# Patient Record
Sex: MALE | Race: WHITE | Employment: OTHER | RURAL
[De-identification: names, ages, dates, MRNs, and addresses within clinical notes are randomized per-mention and may not be internally consistent; named-entity substitution may affect disease eponyms.]

---

## 2021-12-21 ENCOUNTER — HOSPITAL ENCOUNTER (OUTPATIENT)
Dept: PHYSICAL THERAPY | Age: 66
Discharge: HOME | End: 2021-12-21
Payer: COMMERCIAL

## 2021-12-21 PROCEDURE — 97110 THERAPEUTIC EXERCISES: CPT

## 2021-12-21 PROCEDURE — 97161 PT EVAL LOW COMPLEX 20 MIN: CPT

## 2021-12-21 PROCEDURE — 97014 ELECTRIC STIMULATION THERAPY: CPT

## 2021-12-21 NOTE — PROGRESS NOTES
[facility]  [address] [facility] [place] [facility]:  [phone]  FAX: [phone]      Plan of Care/ Statement of Necessity for Physical Therapy Services    Patient name: Christel Ventura Start of Care: 2021   Referral source: Andre Jasso,* : 1955    Medical Diagnosis: Other low back pain [M54.59]   Onset Date: 2021    Treatment Diagnosis: low back pain   Prior Hospitalization: see medical history Provider#: [id number]   Medications: Verified on Patient summary List    Comorbidities: see medically   Prior Level of Function: independent     The Plan of Care and following information is based on the information from the initial evaluation. Assessment/ key information: Pt. Arriving to therapy reporting lower lumbar back pain/stiffness, specifically the R side> L that does not radiate to his lower extremities. Pt. Has a negative SLR as well bilaterally. Pt. Demonstrated decreased functional flexibility in hamstrings, piriformis, and various other LE R>L upon assessment. Pt. Also has decreased functional lumbar/T-spine ROM deficits that physical therapy could address.     Evaluation Complexity History LOW Complexity : Zero comorbidities / personal factors that will impact the outcome / POC; Examination LOW Complexity : 1-2 Standardized tests and measures addressing body structure, function, activity limitation and / or participation in recreation  ;Presentation LOW Complexity : Stable, uncomplicated  ;Clinical Decision Making LOW Complexity : FOTO score of   Overall Complexity Rating: LOW   Problem List: pain affecting function, decrease ROM and decrease strength   Treatment Plan may include any combination of the following: Therapeutic exercise, Therapeutic activities, Neuromuscular re-education and Physical agent/modality  Patient / Family readiness to learn indicated by: asking questions, trying to perform skills and interest  Persons(s) to be included in education: patient (P)  Barriers to Learning/Limitations: None  Patient Goal (s): I want to be able to do all of my normal routine without this pain in my back.   Patient Self Reported Health Status: good  Rehabilitation Potential: good    Short Term Goals: To be accomplished in 8 treatments:   Pt. Will be independent and adherent to HEP   Pt. Will be able to bend down and  a 5-10lb object without lower lumbar pain/stiffiness/discomfort  Long Term Goals: To be accomplished in 16 treatments:   Pt. Will have WFL Lumbar spine ROM   Pt. Will score >72 on FOTO   Pt. Will demonstrate proper ergonomics and form with all therapeutic exercises for prevention of future strain on the lumbar spine  Frequency / Duration: Patient to be seen 1 time per week for 16 treatments. Patient/ Caregiver education and instruction: Diagnosis, prognosis, self care and exercises   []  Plan of care has been reviewed with PTA    Emil Zhou, PT, DPT, EP-C 12/21/2021     ________________________________________________________________________    I certify that the above Therapy Services are being furnished while the patient is under my care. I agree with the treatment plan and certify that this therapy is necessary. [de-identified] Signature:____________________  Date:____________Time: _________           Shannan Colon,*    Please sign and return to:  [facility]  [address] [place], [place], [address] [facility]:  [address] [place] [address] Street: [phone]

## 2021-12-21 NOTE — THERAPY EVALUATION
PT INITIAL EVALUATION NOTE - [facility] 2-15    Patient Name: Rebekah Fabry  Date:2021  : 1955  [x]  Patient  Verified  Payor: Shelby Baltazar / Plan: Hannah Rios / Product Type: Commerical /    In time: 647 Out time:  Total Treatment Time (min): 40  Total Timed Codes (min): 40  1:1 Treatment Time ( [address] only): 40   Visit #: 1     Treatment Area: Other low back pain [M54.59]    SUBJECTIVE  Pain Level (0-10 scale): 0/10  Any medication changes, allergies to medications, adverse drug reactions, diagnosis change, or new procedure performed?: [] No    [x] Yes (see summary sheet for update)  Subjective:    \"I tore my muscle in my back bending down doing work outside. \"  PMHx/Surgical Hx: see medical history  Living Situation: at home with wife  Pt Goals: \"Get back to not having       OBJECTIVE/EXAMINATION  Posture: Forward head, rounded shoulders  Gait and Functional Mobility: WFL gait  L sided Lumbar restriction with Functional ROM  WFL MMT BLE  Negative Bilateral SLR    Modality rationale: decrease edema, decrease inflammation and decrease pain to improve the patients ability to perform at home and community more effectively.    Min Type Additional Details   15 [x] Estim: [x]Att   []Unatt        []TENS instruct                  [x]IFC  []Premod   []NMES                     []Other:  []w/US   []w/ice   [x]w/heat  Position: prone  Location: lower lumbar spine    []  Traction: [] Cervical       []Lumbar                       [] Prone          []Supine                       []Intermittent   []Continuous Lbs:  [] before manual  [] after manual  []w/heat    []  Ultrasound: []Continuous   [] Pulsed at:                           []1MHz   []3MHz Location:  W/cm2:    [] Paraffin         Location:   []w/heat    []  Ice     []  Heat  []  Ice massage Position:  Location:    []  Laser  []  Other: Position:  Location:      []  Vasopneumatic Device Pressure:       [] lo [] med [] hi   Temperature:      [x] Skin assessment post-treatment:  [x]intact [x]redness- no adverse reaction    []redness  adverse reaction:      10 min Therapeutic Exercise:  [x] See flow sheet :   Rationale: increase strength and improve coordination to improve the patients ability to perform in the home and community more effectively.           With   [x] TE   [] TA   [] neuro   [] other: Patient Education: [x] Review HEP    [] Progressed/Changed HEP based on:   [] positioning   [] body mechanics   [] transfers   [] heat/ice application    [] other:      Other Objective/Functional Measures: FOTO    Pain Level (0-10 scale) post treatment:     ASSESSMENT/Changes in Function: see POC  [x]  See Plan of Su[address], PT, DPT, EP-C 12/21/2021

## 2021-12-27 ENCOUNTER — HOSPITAL ENCOUNTER (OUTPATIENT)
Dept: PHYSICAL THERAPY | Age: 66
Discharge: HOME | End: 2021-12-27
Payer: COMMERCIAL

## 2021-12-27 PROCEDURE — 97110 THERAPEUTIC EXERCISES: CPT

## 2021-12-27 PROCEDURE — 97014 ELECTRIC STIMULATION THERAPY: CPT

## 2021-12-27 PROCEDURE — 97140 MANUAL THERAPY 1/> REGIONS: CPT

## 2021-12-27 NOTE — PROGRESS NOTES
PT DAILY TREATMENT NOTE     Patient Name: Robert Tsang  Date:2021  : 1955  [x]  Patient  Verified  Payor: Jerson Elizabeth / Plan: Sid Ross / Product Type: Commerical /    In time:915  Out time:1000  Total Treatment Time (min): 45  Visit #: 2    Treatment Area:  Other low back pain [M54.59]    SUBJECTIVE  Pain Level (0-10 scale): \"stiff\" on the R side  Any medication changes, allergies to medications, adverse drug reactions, diagnosis change, or new procedure performed?: [x] No    [] Yes (see summary sheet for update)  Subjective functional status/changes:   [] No changes reported    OBJECTIVE    Modality rationale: decrease inflammation and decrease pain to improve the patients ability to perform in the home and community effectively   Min Type Additional Details   15 [x] Estim:  [x]Unatt       [x]IFC  []Premod                        []Other:  []w/ice   [x]w/heat  Position: Prone  Location: lower lumbar spine    [] Estim: []Att    []TENS instruct  []NMES                    []Other:  []w/US   []w/ice   []w/heat  Position:  Location:    []  Traction: [] Cervical       []Lumbar                       [] Prone          []Supine                       []Intermittent   []Continuous Lbs:  [] before manual  [] after manual    []  Ultrasound: []Continuous   [] Pulsed                           []1MHz   []3MHz W/cm2:  Location:    []  Iontophoresis with dexamethasone         Location: [] Take home patch   [] In clinic    []  Ice     []  heat  []  Ice massage  []  Laser   []  Anodyne Position:  Location:    []  Laser with stim  []  Other:  Position:  Location:    []  Vasopneumatic Device Pressure:       [] lo [] med [] hi   Temperature: [] lo [] med [] hi   [x] Skin assessment post-treatment:  [x]intact [x]redness- no adverse reaction    []redness  adverse reaction:     15 min Therapeutic Exercise:  [x] See flow sheet :   Rationale: increase strength, improve coordination and improve balance to improve the patients ability to perform in the home and community more effectively    15 manual therapy to R lumbar spine with CFM and STM          With   [x] TE   [] TA   [] neuro   [] other: Patient Education: [x] Review HEP    [] Progressed/Changed HEP based on:   [] positioning   [] body mechanics   [] transfers   [] heat/ice application    [] other:      Other Objective/Functional Measures: FOTO     Pain Level (0-10 scale) post treatment: 2/10    ASSESSMENT/Changes in Function: Pt. Responded well to treatment today focusing on bilateral LE flexibility and lower lumbar mobility as well as functional strengthening for stabilization of lumbar spine.     Patient will continue to benefit from skilled PT services to modify and progress therapeutic interventions, address strength deficits, analyze and cue movement patterns and analyze and modify body mechanics/ergonomics to attain remaining goals     [x]  See Plan of Care  []  See progress note/recertification  []  See Discharge Summary         Progress towards goals / Updated goals:  Progressing    PLAN  [x]  Upgrade activities as tolerated     [x]  Continue plan of care  []  Update interventions per flow sheet       []  Discharge due to:_  []  Other:_      Orquidea Johnson, PT, DPT, EP-C 12/27/2021

## 2022-01-04 ENCOUNTER — APPOINTMENT (OUTPATIENT)
Dept: PHYSICAL THERAPY | Age: 67
End: 2022-01-04
Payer: OTHER GOVERNMENT

## 2022-01-11 ENCOUNTER — HOSPITAL ENCOUNTER (OUTPATIENT)
Dept: PHYSICAL THERAPY | Age: 67
Discharge: HOME | End: 2022-01-11
Payer: OTHER GOVERNMENT

## 2022-01-11 PROCEDURE — 97140 MANUAL THERAPY 1/> REGIONS: CPT

## 2022-01-11 PROCEDURE — 97014 ELECTRIC STIMULATION THERAPY: CPT

## 2022-01-11 PROCEDURE — 97110 THERAPEUTIC EXERCISES: CPT

## 2022-01-11 NOTE — PROGRESS NOTES
PT DAILY TREATMENT NOTE     Patient Name: Isaias Maldonado  Date:2022  : 1955  [x]  Patient  Verified  Payor: Fifi Telles / Plan: Rahel Alejandra / Product Type: Commerical /    In MHMP:[id number]  Out BTFW:[id number]  Total Treatment Time (min): 43  Visit #: 3 of 16    Treatment Area: Other low back pain [M54.59]    SUBJECTIVE  Pain Level (0-10 scale): 0/10 pain with primary report of stiffness about right lumbar and sacral region  Any medication changes, allergies to medications, adverse drug reactions, diagnosis change, or new procedure performed?: [x] No    [] Yes (see summary sheet for update)  Subjective functional status/changes:   [] No changes reported  17795 steps yesterday, trying to maintain active life style.   Feels much looser post rx, \"loosest in long time\"    OBJECTIVE    Modality rationale: decrease inflammation and increase tissue extensibility to improve the patients ability to improve flexability and return to previous painfree level of activity   Min Type Additional Details   20 [x] Estim:  [x]Unatt       [x]IFC  []Premod                        []Other:  []w/ice   [x]w/heat  Position:prone  Location: lumbar                                      [x] Skin assessment post-treatment:  [x]intact []redness- no adverse reaction    []redness  adverse reaction:         8 min Therapeutic Exercise:  [x] See flow sheet :   Rationale: increase strength to improve the patients ability to return to previous  painfree level of activity      15 min Manual Therapy: sacral unwedging f/b isometric hip f/e,  Ischemic compression to right lumbar paraspinal tp's, over pressure in cross body stretch, cross fiber soft tissue mobilization   Rationale: increase ROM, increase tissue extensibility and decrease trigger points to return to previous painfree level of activity            With   [] TE   [x] TA   [] neuro   [] other: Patient Education: [x] Review HEP    [] Progressed/Changed HEP based on:   [x] positioning   [] body mechanics   [] transfers   [] heat/ice application    [] other: use of tennis ball for soft tissue management        Pain Level (0-10 scale) post treatment: 0/10    ASSESSMENT/Changes in Function: Patient demonstrates trunk extension and lateral flexion movements that he reports he was not able to perform pre rx    Patient will continue to benefit from skilled PT services to modify and progress therapeutic interventions, address ROM deficits, address strength deficits and analyze and address soft tissue restrictions to attain remaining goals.      [x]  See Plan of Care  []  See progress note/recertification  []  See Discharge Summary           PLAN  [x]  Upgrade activities as tolerated     [x]  Continue plan of care  []  Update interventions per flow sheet       []  Discharge due to:_  []  Other:_      Yenny Carrera, PT 1/11/2022

## 2022-01-21 ENCOUNTER — HOSPITAL ENCOUNTER (OUTPATIENT)
Dept: PHYSICAL THERAPY | Age: 67
Discharge: HOME | End: 2022-01-21
Payer: OTHER GOVERNMENT

## 2022-01-21 PROCEDURE — 97014 ELECTRIC STIMULATION THERAPY: CPT

## 2022-01-21 PROCEDURE — 97140 MANUAL THERAPY 1/> REGIONS: CPT

## 2022-01-21 NOTE — PROGRESS NOTES
PT DAILY TREATMENT NOTE     Patient Name: Isaias Maldonado  DONS:[id number]  : 1955  [x]  Patient  Verified  Payor: Fifi Telles / Plan: Rahel Alejandra / Product Type: Commerical /    In time:1400  Out time:1445  Total Treatment Time (min): 45  Visit #: 4 of 16    Treatment Area: Other low back pain [M54.59]    SUBJECTIVE  Pain Level (0-10 scale): 1 to 1.5/10  Any medication changes, allergies to medications, adverse drug reactions, diagnosis change, or new procedure performed?: [x] No    [] Yes (see summary sheet for update)  Subjective functional status/changes:   [] No changes reported  Has returned to [facility], feels much improved since previous rx, has a remaining \"kink\" in his back.   Asking about TENS    OBJECTIVE    Modality rationale: decrease inflammation and decrease pain to improve the patients ability to resume painfree leisure and work interests   Min Type Additional Details   20 [x] Estim:  [x]Unatt       [x]IFC  []Premod                        []Other:  []w/ice   [x]w/heat  Position:prone  Location: L-S paraspinals                                      [x] Skin assessment post-treatment:  [x]intact [x]redness- no adverse reaction      15 min Manual Therapy:  Ischemic and cross fiber compression, manual stretch   Rationale: decrease pain, increase ROM, increase tissue extensibility and decrease trigger points to permit pt to resume painfree leisure and work interests            With   [] TE   [] TA   [] neuro   [] other: Patient Education: [x] Review HEP    [] Progressed/Changed HEP based on:   [] positioning   [] body mechanics   [] transfers   [] heat/ice application    [] other:          Pain Level (0-10 scale) post treatment: feels improved but did not quantify    ASSESSMENT/Changes in Function:     Patient will continue to benefit from skilled PT services to modify and progress therapeutic interventions, address ROM deficits and analyze and address soft tissue restrictions to attain remaining goals. [x]  See Plan of Care  []  See progress note/recertification  []  See Discharge Summary             PLAN  [x]  Upgrade activities as tolerated     [x]  Continue plan of care  [x]  Update interventions per flow sheet       []  Discharge due to:_  []  Other:_      Luis A Hammonds, PT 1/21/2022      .

## 2022-01-21 NOTE — PROGRESS NOTES
[facility]   [address], [address]  Phone: [phone]      Fax:  [phone]    Progress Note    Name: Dwayne Cantu   : 1955   MD: Yusra Lang MD       Treatment Diagnosis: Other low back pain [M54.59]  Start of Care:    Visits from Start of Care: 4  Missed Visits: 0    Summary of Care: This man has received a program of anti inflammation modalities with an aggressive stretching program. He reports now that he has much less pain, still feels as if he has a \"kink\" in his back, and has returned to shobha chi program. His pain severity score has reduced from 3-4/10 initially to 1-1.5/10 currently. Assessment / Recommendations:     Goal: Patient will be independent and compliant with Home exercise program  Status at last note/certification: new  Status at progress note: met    Goal: Patient will be able to bend down and  a 5-10 lb object without lower lumbar pain/stiffness/discomfort. Status at last note/certification: new  Status at progress note: met    Goal:Patient will have lumbar arom wfl. Status at last note/certification: new  Status at progress note: not met    Goal: FOTO score >72  Status at last note/certification:  new  Status at progress note: not met    Other: I recommend continuation of 1x/wk program of aggressive stretch and anti inflammatory program        Aj Sal, PT 2022 1:34 PM    ________________________________________________________________________  NOTE TO PHYSICIAN:  Please complete the following and fax to: [facility]: Fax: [phone]. Hue Elizabeth Retain this original for your records. If you are unable to process this request in 24 hours, please contact our office.        ____ I have read the above report and request that my patient continue therapy with the following changes/special instructions:  ____ I have read the above report and request that my patient be discharged from therapy    Physician's Signature:_________________ Date:___________Time:__________

## 2022-01-25 ENCOUNTER — HOSPITAL ENCOUNTER (OUTPATIENT)
Dept: PHYSICAL THERAPY | Age: 67
Discharge: HOME | End: 2022-01-25
Payer: OTHER GOVERNMENT

## 2022-01-25 PROCEDURE — 97140 MANUAL THERAPY 1/> REGIONS: CPT

## 2022-01-25 PROCEDURE — 97014 ELECTRIC STIMULATION THERAPY: CPT

## 2022-01-25 NOTE — PROGRESS NOTES
PT DAILY TREATMENT NOTE     Patient Name: Katherin Carpenter  SQVB:  : 1955  [x]  Patient  Verified  Payor: Marivel Umanzor / Plan: Darrick Moyer / Product Type: Commerical /    In NDXN:7080  Out UMWW:8399  Total Treatment Time (min): 45  Visit #: 5 of 16    Treatment Area:  Other low back pain [M54.59]    SUBJECTIVE  Pain Level (0-10 scale): 1-2  Any medication changes, allergies to medications, adverse drug reactions, diagnosis change, or new procedure performed?: [x] No    [] Yes (see summary sheet for update)  Subjective functional status/changes:   [] No changes reported  Feels as if he is improving, less pain,  Mild \"kinks\"    OBJECTIVE    Modality rationale: decrease inflammation, decrease pain and increase tissue extensibility to improve the patients ability to return to painfree leisure and work activities   Min Type Additional Details   20 [x] Estim:  [x]Unatt       [x]IFC  []Premod                        []Other:  []w/ice   [x]w/heat  Position:prone  Location: right hip and paraspin    [] Estim: []Att    []TENS instruct  []NMES                    []Other:  []w/US   []w/ice   []w/heat  Position:  Location:    []  Traction: [] Cervical       []Lumbar                       [] Prone          []Supine                       []Intermittent   []Continuous Lbs:  [] before manual  [] after manual    []  Ultrasound: []Continuous   [] Pulsed                           []1MHz   []3MHz W/cm2:  Location:    []  Iontophoresis with dexamethasone         Location: [] Take home patch   [] In clinic    []  Ice     []  heat  []  Ice massage  []  Laser   []  Anodyne Position:  Location:    []  Laser with stim  []  Other:  Position:  Location:    []  Vasopneumatic Device Pressure:       [] lo [] med [] hi   Temperature: [] lo [] med [] hi   [x] Skin assessment post-treatment:  [x]intact [x]redness- no adverse reaction    []redness  adverse reaction:       25 min Manual Therapy:  Consisting of unwedge of sacrum followed by hip extension, ischemic compression of sciatic notch and L5-S1, cross fiber compression, trunk rotation to left and right with over pressure. Finish in child pose   Rationale: decrease pain, increase ROM, increase tissue extensibility and decrease trigger points to promote and permit return to painfree leisure and work activity        With   [] TE   [] TA   [] neuro   [] other: Patient Education: [] Review HEP    [] Progressed/Changed HEP based on:   [] positioning   [] body mechanics   [] transfers   [] heat/ice application    [x] other: technique and rationale for use of tennis ball or shepards hook for soft tissue self care          Pain Level (0-10 scale) post treatment: 0-1    ASSESSMENT/Changes in Function: This pleasant man reports ongoing improvement in pain with aggressive soft tissue and SI mobilization. He reports that he is compliant with Hep and was educated in technique and rational for use of shepards hook or tennis ball    Patient will continue to benefit from skilled PT services to modify and progress therapeutic interventions, address ROM deficits and analyze and address soft tissue restrictions to attain remaining goals.      []  See Plan of Care  []  See progress note/recertification  []  See Discharge Summary     PLAN  [x]  Upgrade activities as tolerated     [x]  Continue plan of care  [x]  Update interventions per flow sheet       []  Discharge due to:_  []  Other:_      Emanuel Truong, PT 1/25/2022

## 2022-01-27 ENCOUNTER — HOSPITAL ENCOUNTER (OUTPATIENT)
Dept: PHYSICAL THERAPY | Age: 67
Discharge: HOME | End: 2022-01-27
Payer: OTHER GOVERNMENT

## 2022-01-27 PROCEDURE — 97110 THERAPEUTIC EXERCISES: CPT

## 2022-01-27 PROCEDURE — 97140 MANUAL THERAPY 1/> REGIONS: CPT

## 2022-01-27 NOTE — PROGRESS NOTES
PT DAILY TREATMENT NOTE     Patient Name: Qi Alcantara  DDZP:[id number]  : 1955  [x]  Patient  Verified  Payor: Shahrzad Wise / Plan: Dilcia Pereyra / Product Type: Commerical /    In time: 830  Out time: 909  Total Treatment Time (min): 39  Visit #: 6    Treatment Area: Other low back pain [M54.59]    SUBJECTIVE  Pain Level (0-10 scale): 0/10  Any medication changes, allergies to medications, adverse drug reactions, diagnosis change, or new procedure performed?: [x] No    [] Yes (see summary sheet for update)  Subjective functional status/changes:   [x] No changes reported    OBJECTIVE  24 min Therapeutic Exercise:  [x] See flow sheet :   Rationale: increase strength, improve coordination and improve balance to improve the patients ability to perform in the community and home more effectively    15 min Manual Therapy:  CFM of lumbar paraspinals with kneading methods   Rationale: increase tissue extensibility to perform in the home and community more effectively        With   [x] TE   [] TA   [] neuro   [] other: Patient Education: [x] Review HEP    [] Progressed/Changed HEP based on:   [] positioning   [] body mechanics   [] transfers   [] heat/ice application    [] other:      Other Objective/Functional Measures: FOTO     Pain Level (0-10 scale) post treatment: 0/10    ASSESSMENT/Changes in Function: Pt. Is progressing well with therapy focusing on increasing lumbar flexibility and relaxation techniques for optimal postural stability. Pt. Is close to d/c and on track to reach goals next visit or 2. Patient will continue to benefit from skilled PT services to modify and progress therapeutic interventions, address functional mobility deficits, analyze and address soft tissue restrictions and analyze and modify body mechanics/ergonomics to attain remaining goals.      [x]  See Plan of Care  []  See progress note/recertification  []  See Discharge Summary         Progress towards goals / Updated goals:  Progressing    PLAN  [x]  Upgrade activities as tolerated     []  Continue plan of care  []  Update interventions per flow sheet       []  Discharge due to:_  []  Other:_      Joceline Castellanos PT, DPT, EP-C 1/27/2022

## 2022-02-08 ENCOUNTER — HOSPITAL ENCOUNTER (OUTPATIENT)
Dept: PHYSICAL THERAPY | Age: 67
Discharge: HOME | End: 2022-02-08
Payer: OTHER GOVERNMENT

## 2022-02-08 PROCEDURE — 97140 MANUAL THERAPY 1/> REGIONS: CPT

## 2022-02-08 PROCEDURE — 97014 ELECTRIC STIMULATION THERAPY: CPT

## 2022-02-08 NOTE — PROGRESS NOTES
PT DAILY TREATMENT NOTE - [facility] 12-16     Short Term Goals: To be accomplished in 8 treatments:              Pt. Will be independent and adherent to HEP met              Pt. Will be able to bend down and  a 5-10lb object without lower lumbar pain/stiffiness/discomfort not attempted  Long Term Goals: To be accomplished in 16 treatments:              Pt. Will have WFL Lumbar spine ROM not met              Pt. Will score >72 on FOTO  met              Pt. Will demonstrate proper ergonomics and form with all therapeutic exercises for prevention of future strain on the lumbar spine met  Patient Name: Genetta Galeazzi  Date:2022  : 1955  [x]  Patient  Verified  Payor: Kingsley Zimmerman / Plan: Severiano Zarate / Product Type: Commerical /    In time 845 Out time:930  Total Treatment Time (min): 45  Total Timed Codes (min): 25  1:1 Treatment Time ( [address] only): 25   Visit #: 7 of 8    Treatment Area: Other low back pain [M54.59]    SUBJECTIVE  Pain Level (0-10 scale): 0-1,  9-7/10 on \"stiffness\" scale pre rx and 2/10 post rx  Any medication changes, allergies to medications, adverse drug reactions, diagnosis change, or new procedure performed?: [x] No    [] Yes (see summary sheet for update)  Subjective functional status/changes:   [] No changes reported  Reports that he has slowly resumed his leisure activities including shobha chi, light weight lifting program and yoga as well as 10-15K steps per day.     OBJECTIVE    Modality rationale: decrease edema, decrease inflammation and increase tissue extensibility to improve the patients ability to resume painfree leisure and work activities   Min Type Additional Details   20 [x] Estim:  [x]Unatt       [x]IFC  []Premod                        []Other:  []w/ice   [x]w/heat  Position:prone  Location: B paraspinals lumbar                                      [x] Skin assessment post-treatment:  [x]intact []redness- no adverse reaction          25 min Manual Therapy:  Ischemic compression, cross body stretch with overpressure, hip flexor mfr, cross friction compression to left lumbar paraspinals   Rationale: decrease pain, increase tissue extensibility and decrease trigger points to permit return to painfree work and leisure activities            With   [] TE   [] TA   [] neuro   [] other: Patient Education: [x] Review HEP    [] Progressed/Changed HEP based on:   [] positioning   [] body mechanics   [] transfers   [] heat/ice application    [] other:      Other Objective/Functional Measures: FOTO score 72 as of 1/27/22     Pain Level (0-10 scale) post treatment: 0-1/10 on pain scale, 2/10 stiffness    ASSESSMENT/Changes in Function: This man is returning to leisure activities and is reporting progressively less pain and stiffness. His left lumbar paraspinal region remains hyper tense and he is benefitting from therapy to manage this vestige of low back pain.      Patient will continue to benefit from skilled PT services to address ROM deficits and analyze and address soft tissue restrictions to attain remaining goals of normal lumbar rom and lifting 5 pounds from floor without pain   [x]  See Plan of Care  []  See progress note/recertification  []  See Discharge Summary         Progress towards goals / Updated goals:  foto score now 72    PLAN  [x]  Upgrade activities as tolerated     [x]  Continue plan of care  [x]  Update interventions per flow sheet       []  Discharge due to:_  []  Other:_      Nicole Oneill, PT 2/8/2022

## 2022-03-09 NOTE — THERAPY DISCHARGE
[facility]  [address], [address]  Phone: [phone] Fax: [phone]      Discharge Summary 2-15    Patient name: Ozzy Hunter  : 1955  Provider#: [id number]  Referral source: Jana Aponte MD      Medical/Treatment Diagnosis: Other low back pain [M54.59]     Prior Hospitalization: see medical history     Comorbidities: See Plan of Care  Prior Level of Function: See Plan of Care  Medications: Verified on Patient Summary List    Start of Care: 21      Onset Date:21   Visits from Start of Care: 6     Missed Visits: 6  Reporting Period : 21 to 22    Assessment/Summary of care: This man attended six therapy sessions consisting of pain relief modalities and exercises and manual therapy to relieve hs primary complaint of lumbar stiffness\". Therapy sessions had a big impact on his primary complaint but did not have carryover effect to  Next session. Reported decrease in sx from 8/10 to 2/10 with session. Short Term Goals: To be accomplished in 8 treatments:              Pt. Will be independent and adherent to HEP met              Pt. Will be able to bend down and  a 5-10lb object without lower lumbar pain/stiffiness/discomfort not attempted  Long Term Goals: To be accomplished in 16 treatments:              Pt. Will have WFL Lumbar spine ROM not met              Pt. Will score >72 on FOTO  met              Pt.  Will demonstrate proper ergonomics and form with all therapeutic exercises for prevention of future strain on the lumbar spine met    RECOMMENDATIONS:  [x]Discontinue therapy: []Patient has reached or is progressing toward set goals     [x]Patient is non-compliant or has abdicated     []Due to lack of appreciable progress towards set goals     []Other    Graciela Thornton, PT 3/9/2022

## 2023-02-16 ENCOUNTER — HOSPITAL ENCOUNTER (OUTPATIENT)
Dept: PHYSICAL THERAPY | Age: 68
Discharge: HOME | End: 2023-02-16
Payer: MEDICARE

## 2023-02-16 PROCEDURE — 97016 VASOPNEUMATIC DEVICE THERAPY: CPT

## 2023-02-16 PROCEDURE — 97161 PT EVAL LOW COMPLEX 20 MIN: CPT

## 2023-02-16 PROCEDURE — 97110 THERAPEUTIC EXERCISES: CPT

## 2023-02-16 NOTE — PROGRESS NOTES
PT INITIAL EVALUATION NOTE - [facility] 2-15    Patient Name: Yvonne Linda  Date:2023  : 1955  [x]  Patient  Verified  Payor: Ricardo Jax / Plan: [place] MEDICARE PART A & B / Product Type: Medicare /    In time:3:00 pm  Out time:4:00 pm  Total Treatment Time (min): 60  Total Timed Codes (min): 45  1:1 Treatment Time ( [address] only): 39   Visit #:      Treatment Area: Left knee pain [M25.562]    SUBJECTIVE  Pain Level (0-10 scale): 410  Any medication changes, allergies to medications, adverse drug reactions, diagnosis change, or new procedure performed?: [] No    [x] Yes (see summary sheet for update)  Subjective:  2022 doing yardwork and had the knee \"shift\" and then has hurt since. PLOF: ind with functional activities, walks daily, yard work, amb ind using no AD. Mechanism of Injury: was in the yard back in 2022 and his knee \"shifted\"  Previous Treatment/Compliance: cortisone shot in Dec 2022  PMHx/Surgical Hx: shady THR, cervical fusion, Rotator cuff repair R   Work Hx: retired  Living Situation: lives with his wife  Pt Goals: \"better ROM and strength\"    Motivation: good    Cognition: A & O x 4        OBJECTIVE/EXAMINATION    Other Observations:  toe out with hip external rotation on the L during gait. Hips flexed in standing   Gait and Functional Mobility:  Antalgic gait pattern, tight hip flexors in stance. Ind with amb using no AD, ind with ascending and descending the stairs step over step using no railing and reporting no pain. Palpation: tenderness over L patella and L IT band. Slight edema noted in joint. MMT: L hip  abd 3+/5 L hip flexors 4-/5 L knee 4/5, R hip abd 4-/5, R hip flexors 4-/5.  ROM L knee 3-135  Neurological: Reflexes / Sensations: intact         Modality rationale: decrease edema, decrease inflammation, and decrease pain to improve the patients ability to function without pain    Min Type Additional Details    [] Estim: []Att   []Unatt        []TENS instruct []IFC  []Premod   []NMES                     []Other:  []w/US   []w/ice   []w/heat  Position:  Location:    []  Traction: [] Cervical       []Lumbar                       [] Prone          []Supine                       []Intermittent   []Continuous Lbs:  [] before manual  [] after manual  []w/heat    []  Ultrasound: []Continuous   [] Pulsed at:                           []1MHz   []3MHz Location:  W/cm2:    [] Paraffin         Location:   []w/heat    []  Ice     []  Heat  []  Ice massage Position:  Location:    []  Laser  []  Other: Position:  Location:      [x]  Vasopneumatic Device Pressure:       [x] lo [] med [] hi   Temperature: 38      [x] Skin assessment post-treatment:  [x]intact []redness- no adverse reaction    []redness - adverse reaction:     30 min Therapeutic Exercise:  [x] See flow sheet :   Rationale: increase ROM and increase strength to improve the patients ability to function with out pain             With   [] TE   [] TA   [] neuro   [] other: Patient Education: [x] Review HEP    [] Progressed/Changed HEP based on:   [] positioning   [] body mechanics   [] transfers   [] heat/ice application    [x] other: instructed patient in HEP including SLR, clam shells, IT band stretch and IT band form roller         Pain Level (0-10 scale) post treatment: 2/10    ASSESSMENT/Changes in Function: Pt presents to OP PT with increased pain in L knee since Nov 2022 when he was performing yard work, he did receive cortisone shot in Dec 2022 with some relief. He reports daily pain of 4-6/10. He demonstrated over all weakness in shady hips, tenderness in L IT band and lateral aspect of L knee as well as tenderness over distal patella tendon. Slight edema noted in L knee. Patient goals are to be pain free with activities, strengthen shady hips and L knee.      [x]  See Plan of [address], PT 2/16/2023

## 2023-02-16 NOTE — PROGRESS NOTES
[facility]  [address], [place], [address]:  [phone]  FAX: [phone]    Plan of Care/Statement of Necessity for Physical Therapy Services  2-15    Patient name: Annette Westbrook  : 1955  Provider#: [id number]  Referral source: Scott Avelar MD      Medical/Treatment Diagnosis: Left knee pain [M25.562]     Prior Hospitalization: see medical history     Comorbidities: arthritis,   Prior Level of Function: active, yard work, walking daily, retired, ind amb using no AD  Medications: Verified on Patient Summary List  Start of Care: 2023      Onset Date: 2022   The Plan of Care and following information is based on the information from the initial evaluation. Assessment/ key information:  Pt presents to OP PT with increased pain in L knee since 2022 when he was performing yard work, he did receive cortisone shot in Dec 2022 with some relief. He reports daily pain of 4-6/10. He demonstrated over all weakness in shady hips, tenderness in L IT band and lateral aspect of L knee as well as tenderness over distal patella tendon. Slight edema noted in L knee. Patient goals are to be pain free with activities, strengthen shady hips and L knee.      Evaluation Complexity History LOW Complexity : Zero comorbidities / personal factors that will impact the outcome / POC; Examination LOW Complexity : 1-2 Standardized tests and measures addressing body structure, function, activity limitation and / or participation in recreation  ;Presentation LOW Complexity : Stable, uncomplicated  ;Clinical Decision Making LOW Complexity : FOTO score of   Overall Complexity Rating: LOW     Problem List: pain affecting function, decrease strength, edema affecting function, decrease activity tolerance, and decrease flexibility/ joint mobility   Treatment Plan may include any combination of the following: Therapeutic exercise, Neuromuscular reeducation, Manual therapy, Therapeutic activity, Self care/home management, Electric stim unattended , and Vasopneumatic device  Patient / Family readiness to learn indicated by: asking questions and trying to perform skills  Persons(s) to be included in education: patient (P)  Barriers to Learning/Limitations: None  Patient Goal (s): pain free, gain strength and flexibility   Patient Self Reported Health Status: good  Rehabilitation Potential: good    Short Term Goals: To be accomplished in 8  treatments:  Patient will be ind with HEP to manage symptoms   Patient will report decreased pain in L knee 2/10 with exercises and functional activities  Patient will demonstrate increased strength when performing exercises and functional activities  Long Term Goals: To be accomplished in 16 treatments:  Patient to resume all activities at home with no report of pain   Patient to demonstrate 5/5 strength in Ricki hips and L knee. Patient will demonstrate no edema in L Knee     Frequency / Duration: Patient to be seen 2 times per week for 16 treatments. Patient/ Caregiver education and instruction: exercises see chart     [x]  Plan of care has been reviewed with PTA    The severity rating is based on clinical judgment and the FOTO Score score. Certification Period: 2/16/23-5/16/23  Leandro Saenz, PT 2/16/2023   ________________________________________________________________________    I certify that the above Therapy Services are being furnished while the patient is under my care. I agree with the treatment plan and certify that this therapy is necessary. Physician's Signature:____________________  Date:____________Time: _________           Jessi Sarmiento MD    Please sign and return to: [facility]  [address], [place], [address] [facility]:  [address] [place] [address] Street: [phone]

## 2023-02-21 ENCOUNTER — HOSPITAL ENCOUNTER (OUTPATIENT)
Dept: PHYSICAL THERAPY | Age: 68
Discharge: HOME | End: 2023-02-21
Payer: MEDICARE

## 2023-02-21 PROCEDURE — 97110 THERAPEUTIC EXERCISES: CPT

## 2023-02-21 PROCEDURE — 97140 MANUAL THERAPY 1/> REGIONS: CPT

## 2023-02-21 PROCEDURE — 97016 VASOPNEUMATIC DEVICE THERAPY: CPT

## 2023-02-21 NOTE — PROGRESS NOTES
PT DAILY TREATMENT NOTE - [facility]     Patient Name: Lucy Savage  Date:2023  : 1955  [x]  Patient  Verified  Payor: [place] MEDICARE / Plan: VA MEDICARE PART A & B / Product Type: Medicare /    In time:320  Out time:420  Total Treatment Time (min): 60  Total Timed Codes (min): 45  1:1 Treatment Time ( [address] only): 39   Visit #: 2    Treatment Area: Left knee pain [M25.562]    SUBJECTIVE  Pain Level (0-10 scale): 4  Any medication changes, allergies to medications, adverse drug reactions, diagnosis change, or new procedure performed?: [x] No    [] Yes (see summary sheet for update)  Subjective functional status/changes:   [] No changes reported  I really felt the IT Band after that first session     OBJECTIVE    Modality rationale: decrease inflammation and decrease pain to improve the patients ability to amb without feeling of tightness   Min Type Additional Details    [] Estim:  []Unatt       []IFC  []Premod                        []Other:  []w/ice   []w/heat  Position:  Location:    [] Estim: []Att    []TENS instruct  []NMES                    []Other:  []w/US   []w/ice   []w/heat  Position:  Location:    []  Traction: [] Cervical       []Lumbar                       [] Prone          []Supine                       []Intermittent   []Continuous Lbs:  [] before manual  [] after manual    []  Ultrasound: []Continuous   [] Pulsed                           []1MHz   []3MHz W/cm2:  Location:    []  Iontophoresis with dexamethasone         Location: [] Take home patch   [] In clinic    []  Ice     []  heat  []  Ice massage  []  Laser   []  Anodyne Position:  Location:    []  Laser with stim  []  Other:  Position:  Location:   15 [x]  Vasopneumatic Device Pressure:       [x] lo [] med [] hi   Temperature: [x] lo [] med [] hi   [] Skin assessment post-treatment:  []intact []redness- no adverse reaction    []redness - adverse reaction:     35 min Therapeutic Exercise:  [] See flow sheet :   Rationale: increase ROM and increase strength to improve the patients ability to release lateral tension      10 min Manual Therapy:  Glute, TFL and ITB releases   Rationale: decrease pain, increase ROM, and increase tissue extensibility to amb with less antalgia. With   [x] TE   [] TA   [] neuro   [] other: Patient Education: [x] Review HEP    [] Progressed/Changed HEP based on:   [] positioning   [] body mechanics   [] transfers   [] heat/ice application    [] other:         Pain Level (0-10 scale) post treatment: 0    ASSESSMENT/Changes in Function: Held ITB rolling on this session. Pt responded well to ex, with manual and vaso pneumatic. Pt remarks feeling of release with manual. Will continue to advance ex program, reintroducing ITB rolling. Patient will continue to benefit from skilled PT services to address ROM deficits, address strength deficits, and analyze and address soft tissue restrictions to attain remaining goals.      [x]  See Plan of Care  []  See progress note/recertification  []  See Discharge Summary             PLAN  [x]  Upgrade activities as tolerated     []  Continue plan of care  []  Update interventions per flow sheet       []  Discharge due to:_  []  Other:_      Vincent Serrano, KALE 2/21/2023

## 2023-02-28 ENCOUNTER — HOSPITAL ENCOUNTER (OUTPATIENT)
Dept: PHYSICAL THERAPY | Age: 68
Discharge: HOME | End: 2023-02-28
Payer: MEDICARE

## 2023-02-28 PROCEDURE — 97016 VASOPNEUMATIC DEVICE THERAPY: CPT

## 2023-02-28 PROCEDURE — 97110 THERAPEUTIC EXERCISES: CPT

## 2023-02-28 PROCEDURE — 97140 MANUAL THERAPY 1/> REGIONS: CPT

## 2023-02-28 NOTE — PROGRESS NOTES
PT DAILY TREATMENT NOTE - [facility]     Patient Name: Sudheer Truong  Date:2023  : 1955  [x]  Patient  Verified  Payor: VA MEDICARE / Plan: VA MEDICARE PART A & B / Product Type: Medicare /    In time:9:20  Out time:10:20  Total Treatment Time (min): 60  Total Timed Codes (min): 45  1:1 Treatment Time ( [address] only): 39   Visit #: 3 of 16    Treatment Area: Left knee pain [M25.562]    SUBJECTIVE  Pain Level (0-10 scale): 210  Any medication changes, allergies to medications, adverse drug reactions, diagnosis change, or new procedure performed?: [x] No    [] Yes (see summary sheet for update)  Subjective functional status/changes:   [] No changes reported  \"I was very sore after the first treatment but then it calmed down and I was able to walk about 6 miles this weekend in [place] without increased pain in the knee\"     OBJECTIVE    Modality rationale: decrease edema, decrease inflammation, and decrease pain to improve the patients ability to function ind without pain    Min Type Additional Details    [] Estim:  []Unatt       []IFC  []Premod                        []Other:  []w/ice   []w/heat  Position:  Location:    [] Estim: []Att    []TENS instruct  []NMES                    []Other:  []w/US   []w/ice   []w/heat  Position:  Location:    []  Traction: [] Cervical       []Lumbar                       [] Prone          []Supine                       []Intermittent   []Continuous Lbs:  [] before manual  [] after manual    []  Ultrasound: []Continuous   [] Pulsed                           []1MHz   []3MHz W/cm2:  Location:    []  Iontophoresis with dexamethasone         Location: [] Take home patch   [] In clinic    []  Ice     []  heat  []  Ice massage  []  Laser   []  Anodyne Position:  Location:    []  Laser with stim  []  Other:  Position:  Location:   15 min [x]  Vasopneumatic Device Pressure:       [x] lo [] med [] hi   Temp 38 degrees   [x] Skin assessment post-treatment:  [x]intact []redness- no adverse reaction    []redness - adverse reaction:         30 min Therapeutic Exercise:  [x] See flow sheet :   Rationale: increase ROM, increase strength, and improve coordination to improve the patients ability to function ind without pain      15 min Manual Therapy:  soft tissue work to L lateral IT band   Rationale: decrease pain, increase ROM, increase tissue extensibility, and decrease trigger points to decrease pain for ind functional activities    Rationale: With   [x] TE   [] TA   [] neuro   [] other: Patient Education: [x] Review HEP    [] Progressed/Changed HEP based on:   [] positioning   [] body mechanics   [] transfers   [] heat/ice application    [] other:      Other Objective/Functional Measures: Patient shows improvement with pain today in L knee, stating he walked 6 miles over the weekend without any increased pain. Patient continues to demonstrate tightness over L IT band and decreased strength in shady hip area. Patient was instructed in hip stabilization exercises in standing today as well as hip extension exercises. Pain Level (0-10 scale) post treatment: 0/10    ASSESSMENT/Changes in Function: Patient demonstrates less tenderness over L IT band today with soft tissue work, Pt remains ind with amb using no AD. We are holding form roller for IT band at home at this time due to discomfort. Patient demonstrated 0/10 pain after treatment today     Patient will continue to benefit from skilled PT services to modify and progress therapeutic interventions, address functional mobility deficits, address ROM deficits, address strength deficits, and analyze and address soft tissue restrictions to attain remaining goals.      [x]  See Plan of Care  []  See progress note/recertification  []  See Discharge Summary         Progress towards goals / Updated goals:  Patient continues to progress toward goals working on hip strength and stabilization as well as L IT band tightness     PLAN  [x]  Upgrade activities as tolerated     [x]  Continue plan of care  []  Update interventions per flow sheet       []  Discharge due to:_  []  Other:_      Christian Hagan, PT 2/28/2023

## 2023-03-02 ENCOUNTER — HOSPITAL ENCOUNTER (OUTPATIENT)
Dept: PHYSICAL THERAPY | Age: 68
Discharge: HOME | End: 2023-03-02
Payer: MEDICARE

## 2023-03-02 PROCEDURE — 97016 VASOPNEUMATIC DEVICE THERAPY: CPT

## 2023-03-02 PROCEDURE — 97110 THERAPEUTIC EXERCISES: CPT

## 2023-03-02 NOTE — PROGRESS NOTES
PT DAILY TREATMENT NOTE - [facility]     Patient Name: Nima Matias  Date:3/2/2023  : 1955  [x]  Patient  Verified  Payor: VA MEDICARE / Plan: VA MEDICARE PART A & B / Product Type: Medicare /    In time:9:20 am  Out time:10:05  Total Treatment Time (min): 45  Total Timed Codes (min): 45  1:1 Treatment Time ( [address] only): 39   Visit #: 4 of 16    Treatment Area: Left knee pain [M25.562]    SUBJECTIVE  Pain Level (0-10 scale): 2/10  Any medication changes, allergies to medications, adverse drug reactions, diagnosis change, or new procedure performed?: [x] No    [] Yes (see summary sheet for update)  Subjective functional status/changes:   [] No changes reported  \"I have Tenderness in L IT band, reports less discomfort in lateral aspect of R knee\"     OBJECTIVE    Modality rationale: decrease edema, decrease inflammation, decrease pain, increase tissue extensibility, and increase muscle contraction/control to improve the patients ability to function without pain    Min Type Additional Details    [] Estim:  []Unatt       []IFC  []Premod                        []Other:  []w/ice   []w/heat  Position:  Location:    [] Estim: []Att    []TENS instruct  []NMES                    []Other:  []w/US   []w/ice   []w/heat  Position:  Location:    []  Traction: [] Cervical       []Lumbar                       [] Prone          []Supine                       []Intermittent   []Continuous Lbs:  [] before manual  [] after manual    []  Ultrasound: []Continuous   [] Pulsed                           []1MHz   []3MHz W/cm2:  Location:    []  Iontophoresis with dexamethasone         Location: [] Take home patch   [] In clinic    []  Ice     []  heat  []  Ice massage  []  Laser   []  Anodyne Position:  Location:    []  Laser with stim  []  Other:  Position:  Location:   15 mins [x]  Vasopneumatic Device Pressure:       [x] lo [] med [] hi   Temperature: 38 degrees   [x] Skin assessment post-treatment:  [x]intact []redness- no adverse reaction    []redness - adverse reaction:         45 min Therapeutic Exercise:  [] See flow sheet :   Rationale: increase ROM, increase strength, and improve coordination to improve the patients ability to function with no pain ind. With   [x] TE   [] TA   [] neuro   [] other: Patient Education: [x] Review HEP    [x] Progressed/Changed HEP based on:   [] positioning   [] body mechanics   [] transfers   [] heat/ice application    [] other:      Other Objective/Functional Measures: Pt was able to tolerate thera ex working on ricki hip strength and stabilization. HEP was adjusted and exercises were added today for home. Pain Level (0-10 scale) post treatment: 0/10    ASSESSMENT/Changes in Function: Pt is progressing well with ex program and ice and compression. Patient states he has no L lateral knee pain at this time. Ricki Hips continue to be weak and sore with stretching. Patient will continue to benefit from skilled PT services to modify and progress therapeutic interventions, address functional mobility deficits, address ROM deficits, address strength deficits, analyze and address soft tissue restrictions, and analyze and cue movement patterns to attain remaining goals.      [x]  See Plan of Care  []  See progress note/recertification  []  See Discharge Summary         Progress towards goals / Updated goals: Progressing well with Thera exercise and modalities     PLAN  [x]  Upgrade activities as tolerated     [x]  Continue plan of care  []  Update interventions per flow sheet       []  Discharge due to:_  []  Other:_      Brandy Rausch, PT 3/2/2023

## 2023-03-13 ENCOUNTER — HOSPITAL ENCOUNTER (OUTPATIENT)
Dept: PHYSICAL THERAPY | Age: 68
Discharge: HOME | End: 2023-03-13
Payer: MEDICARE

## 2023-03-13 PROCEDURE — 97016 VASOPNEUMATIC DEVICE THERAPY: CPT

## 2023-03-13 PROCEDURE — 97140 MANUAL THERAPY 1/> REGIONS: CPT

## 2023-03-13 PROCEDURE — 97110 THERAPEUTIC EXERCISES: CPT

## 2023-03-13 NOTE — PROGRESS NOTES
PT DAILY TREATMENT NOTE - [facility]     Patient Name: Angie Shall  Date:3/13/2023  : 1955  [x]  Patient  Verified  Payor: [place] / Plan: [place] MED[place] & B / Product Type: Medicare /    In time:2  Out time:255  Total Treatment Time (min): 55  Total Timed Codes (min): 40  1:1 Treatment Time ([facility] only): 40   Visit #: 5 of 16    Treatment Area: Left knee pain [M25.562]    SUBJECTIVE  Pain Level (0-10 scale): 4-5  Any medication changes, allergies to medications, adverse drug reactions, diagnosis change, or new procedure performed?: [x] No    [] Yes (see summary sheet for update)  Subjective functional status/changes:   [] No changes reported  Patient reports that his knee has been bothering him yesterday, was walking on unlevel surfaces, was a 6-7/10    OBJECTIVE    Modality rationale: decrease edema, decrease inflammation, and increase tissue extensibility to improve the patients ability to perform functional activities   Min Type Additional Details    [] Estim:  []Unatt       []IFC  []Premod                        []Other:  []w/ice   []w/heat  Position:  Location:    [] Estim: []Att    []TENS instruct  []NMES                    []Other:  []w/US   []w/ice   []w/heat  Position:  Location:    []  Traction: [] Cervical       []Lumbar                       [] Prone          []Supine                       []Intermittent   []Continuous Lbs:  [] before manual  [] after manual    []  Ultrasound: []Continuous   [] Pulsed                           []1MHz   []3MHz W/cm2:  Location:    []  Iontophoresis with dexamethasone         Location: [] Take home patch   [] In clinic    []  Ice     []  heat  []  Ice massage  []  Laser   []  Anodyne Position:  Location:    []  Laser with stim  []  Other:  Position:  Location:   15 [x]  Vasopneumatic Device Pressure:       [x] lo [] med [] hi   Temperature: [x] lo [] med [] hi   [x] Skin assessment post-treatment:  [x]intact []redness- no adverse reaction    []redness - adverse reaction:     30 min Therapeutic Exercise:  [x] See flow sheet :   Rationale: increase ROM and increase strength to improve the patients ability to perform functional activities    15 min Manual Therapy:  manual stretching of ITB, hamstring, iliacus MFR   Rationale: decrease pain, increase ROM, increase tissue extensibility, and decrease trigger points to improve functional mobility            With   [] TE   [] TA   [] neuro   [] other: Patient Education: [x] Review HEP    [] Progressed/Changed HEP based on:   [] positioning   [] body mechanics   [] transfers   [] heat/ice application    [] other:      Other Objective/Functional Measures: jt edema today     Pain Level (0-10 scale) post treatment: 4    ASSESSMENT/Changes in Function: increased swelling and pain today may be due to patient ambulating on unlevel surfaces over the weekend, he may benefit from KT taping of the knee to improve stability when performing activities on unlevel surfaces    Patient will continue to benefit from skilled PT services to modify and progress therapeutic interventions and address functional mobility deficits to attain remaining goals. [x]  See Plan of Care  []  See progress note/recertification  []  See Discharge Summary         Progress towards goals / Updated goals:  Short Term Goals: To be accomplished in 8  treatments:  Patient will be ind with HEP to manage symptoms   Patient will report decreased pain in L knee 2/10 with exercises and functional activities  Patient will demonstrate increased strength when performing exercises and functional activities  Long Term Goals: To be accomplished in 16 treatments:  Patient to resume all activities at home with no report of pain   Patient to demonstrate 5/5 strength in Ricki hips and L knee.    Patient will demonstrate no edema in L Knee     PLAN  [x]  Upgrade activities as tolerated     [x]  Continue plan of care  [x]  Update interventions per flow sheet       []  Discharge due to:_  []  Other:_      Katherine Carty, PT 3/13/2023

## 2023-03-13 NOTE — PROGRESS NOTES
.[email or web address]   [address] [address], [address]  Phone: [phone]      Fax:  [phone]    Progress Note    Name: Ada Darby   : 1955   MD: Farnaz Montgomery MD       Treatment Diagnosis: Left knee pain [M25.562]  Start of Care: 2023    Visits from Start of Care: 5      Summary of Care:Patient had reported decreased knee pain, 2/10, until doing yardwork over the weekend and knee pain was 6-7/10. He has been educated in the use of his knee sleeve, ice, and may also try KT taping to help decrease pain with activities. Overall, patient is progressing towards goals of increasing his knee strength and stability and remaining active. Continues to benefit from skilled PT. Assessment / Recommendations: Continue skilled PT 1-2 times/week up to 16 visits to work towards goals. Progress towards goals / Updated goals:  Short Term Goals: To be accomplished in 8  treatments:  Patient will be ind with HEP to manage symptoms   Patient will report decreased pain in L knee /10 with exercises and functional activities  Patient will demonstrate increased strength when performing exercises and functional activities  Long Term Goals: To be accomplished in 16 treatments:  Patient to resume all activities at home with no report of pain   Patient to demonstrate 5/5 strength in Ricki hips and L knee. Patient will demonstrate no edema in L Knee     Jad Mata, PT 3/13/2023 3:45 PM    ________________________________________________________________________  NOTE TO PHYSICIAN:  Please complete the following and fax to: [facility]: Fax: [phone]. [facility] Retain this original for your records. If you are unable to process this request in 24 hours, please contact our office.        ____ I have read the above report and request that my patient continue therapy with the following changes/special instructions:  ____ I have read the above report and request that my patient be discharged from therapy    Physician's Signature:_________________ Date:___________Time:__________

## 2023-03-17 ENCOUNTER — HOSPITAL ENCOUNTER (OUTPATIENT)
Dept: PHYSICAL THERAPY | Age: 68
Discharge: HOME | End: 2023-03-17
Payer: MEDICARE

## 2023-03-17 PROCEDURE — 97110 THERAPEUTIC EXERCISES: CPT

## 2023-03-17 PROCEDURE — 97140 MANUAL THERAPY 1/> REGIONS: CPT

## 2023-03-17 PROCEDURE — 97016 VASOPNEUMATIC DEVICE THERAPY: CPT

## 2023-03-17 NOTE — PROGRESS NOTES
PT DAILY TREATMENT NOTE - [facility]     Patient Name: Massimo Fall  Date:3/17/2023  : 1955  [x]  Patient  Verified  Payor: [address] [place] [address] / Plan: [place] MED[place] PART A & B / Product Type: Medicare /    In time:9:15  Out time:10:15  Total Treatment Time (min): 60  Total Timed Codes (min): 45  1:1 Treatment Time ( [address] only): 39   Visit #: 6 of 16    Treatment Area: Left knee pain [M25.562]    SUBJECTIVE  Pain Level (0-10 scale): 3/10  Any medication changes, allergies to medications, adverse drug reactions, diagnosis change, or new procedure performed?: [x] No    [] Yes (see summary sheet for update)  Subjective functional status/changes:   [] No changes reported  Patient states he was getting better then a week ago walked a lot in the yard and really seemed to increase his knee pain on the L.     OBJECTIVE    Modality rationale: decrease edema, decrease inflammation, decrease pain, and increase tissue extensibility to improve the patients ability to promote ind activities with no pain                                            15 mins []  Vasopneumatic Device Pressure:       [] lo [x] med [] hi   Temperature: 36 degrees   [x] Skin assessment post-treatment:  []intact [x]redness- no adverse reaction    []redness - adverse reaction:         30 min Therapeutic Exercise:  [x] See flow sheet :   Rationale: increase ROM, increase strength, and improve coordination to improve the patients ability to promote ind function with little pain or discomfort      15 min Manual Therapy:  Soft tissue massage and Trigger point massage over L IT band and lateral aspect of knee as well as hamstring tendon.  Kinesio taping of L knee for assistance with patella tendon tracking and pain control   Rationale: decrease pain, increase ROM, increase tissue extensibility, and decrease edema  to decrease muscle tightness and pain             With   [x] TE   [] TA   [] neuro   [] other: Patient Education: [x] Review HEP    [] Progressed/Changed HEP based on:   [] positioning   [] body mechanics   [] transfers   [] heat/ice application    [] other:      Other Objective/Functional Measures: Patient presents today with increased pain in lateral aspect of the L knee 3/10. Reports it is better in the past week. Pt's continues to perform HEP and discussed using rolling pin at home to perform massage of L lateral IT band. Mannual therapy today to L IT band d, lateral aspect of knee and hamstring tendon with immediate release and decreased pain to 0/10. Adding some more core stabilization exercises today using ball for bridging activities. Kinesio taping was applied to L knee for proper patella tendon tracking and pain control    Pain Level (0-10 scale) post treatment: 0/10    ASSESSMENT/Changes in Function: Patient was instructed to wear tape for up to 4 days and try outside unlevel surfaces with tape on. Patient has immediate relief of pain with soft tissue massage. Pain better after treatment today. Patient will continue to benefit from skilled PT services to modify and progress therapeutic interventions, address functional mobility deficits, address strength deficits, analyze and address soft tissue restrictions, and analyze and cue movement patterns to attain remaining goals. [x]  See Plan of Care  []  See progress note/recertification  []  See Discharge Summary         Progress towards goals.        PLAN  [x]  Upgrade activities as tolerated     [x]  Continue plan of care  []  Update interventions per flow sheet       []  Discharge due to:_  []  Other:_      Jose Eduardo Rojas, PT 3/17/2023

## 2023-03-21 ENCOUNTER — HOSPITAL ENCOUNTER (OUTPATIENT)
Dept: PHYSICAL THERAPY | Age: 68
Discharge: HOME | End: 2023-03-21
Payer: MEDICARE

## 2023-03-21 PROCEDURE — 97140 MANUAL THERAPY 1/> REGIONS: CPT

## 2023-03-21 PROCEDURE — 97016 VASOPNEUMATIC DEVICE THERAPY: CPT

## 2023-03-21 PROCEDURE — 97110 THERAPEUTIC EXERCISES: CPT

## 2023-03-21 NOTE — PROGRESS NOTES
PT DAILY TREATMENT NOTE - [facility]     Patient Name: Chasity Louise  Date:3/21/2023  : 1955  [x]  Patient  Verified  Payor: Gisela Wade / Plan: [place] MEDICARE PART A & B / Product Type: Medicare /    In time:915  Out time:1015  Total Treatment Time (min): 60  Total Timed Codes (min): 45  1:1 Treatment Time ( [address] only): 45   Visit #: 7 of 16    Treatment Area: Left knee pain [M25.562]    SUBJECTIVE  Pain Level (0-10 scale):  2  Any medication changes, allergies to medications, adverse drug reactions, diagnosis change, or new procedure performed?: [x] No    [] Yes (see summary sheet for update)  Subjective functional status/changes:   [] No changes reported  Taping helped, less pain    OBJECTIVE             Modality rationale: decrease edema, decrease inflammation, and increase tissue extensibility to improve the patients ability to perform functional activities   Min Type Additional Details      [] Estim:  []Unatt       []IFC  []Premod                        []Other:  []w/ice   []w/heat  Position:  Location:      [] Estim: []Att    []TENS instruct  []NMES                    []Other:  []w/US   []w/ice   []w/heat  Position:  Location:      []  Traction: [] Cervical       []Lumbar                       [] Prone          []Supine                       []Intermittent   []Continuous Lbs:  [] before manual  [] after manual      []  Ultrasound: []Continuous   [] Pulsed                           []1MHz   []3MHz W/cm2:  Location:      []  Iontophoresis with dexamethasone         Location: [] Take home patch   [] In clinic      []  Ice     []  heat  []  Ice massage  []  Laser   []  Anodyne Position:  Location:      []  Laser with stim  []  Other:  Position:  Location:    15 [x]  Vasopneumatic Device Pressure:       [x] lo [] med [] hi   Temperature: [x] lo [] med [] hi    [x] Skin assessment post-treatment:  [x]intact []redness- no adverse reaction    []redness - adverse reaction:   30 min Therapeutic Exercise:  [x] See flow sheet :   Rationale: increase ROM, increase strength, and improve coordination to improve the patients ability to promote ind function with little pain or discomfort        15 min Manual Therapy:  Soft tissue massage and Trigger point massage over L IT band and lateral aspect of knee as well as hamstring tendon. Kinesio taping of L knee for assistance with patella tendon tracking and pain control   Rationale: decrease pain, increase ROM, increase tissue extensibility, and decrease edema  to decrease muscle tightness and pain        With   [] TE   [] TA   [] neuro   [] other: Patient Education: [x] Review HEP    [] Progressed/Changed HEP based on:   [] positioning   [] body mechanics   [] transfers   [] heat/ice application    [] other:      Other Objective/Functional Measures: Patient educated in KT tape to purchase and issued written instructions and educated in KT taping     Pain Level (0-10 scale) post treatment: 0    ASSESSMENT/Changes in Function: decreased pain, responding well to KT taping    Patient will continue to benefit from skilled PT services to modify and progress therapeutic interventions and address functional mobility deficits to attain remaining goals. [x]  See Plan of Care  []  See progress note/recertification  []  See Discharge Summary         Progress towards goals / Updated goals:   Working towards goals    PLAN  [x]  Upgrade activities as tolerated     [x]  Continue plan of care  [x]  Update interventions per flow sheet       []  Discharge due to:_  []  Other:_      Fang Garcia, PT 3/21/2023

## 2024-08-23 ENCOUNTER — HOSPITAL ENCOUNTER (OUTPATIENT)
Facility: HOSPITAL | Age: 69
Discharge: HOME | End: 2024-08-26

## 2024-08-23 ENCOUNTER — TRANSCRIBE ORDERS (OUTPATIENT)
Facility: HOSPITAL | Age: 69
End: 2024-08-23

## 2024-08-23 DIAGNOSIS — Z01.818 PREOP EXAMINATION: Primary | ICD-10-CM

## 2024-08-23 DIAGNOSIS — Z01.818 PREOP EXAMINATION: ICD-10-CM

## 2024-08-29 LAB
EKG ATRIAL RATE: 59 BPM
EKG DIAGNOSIS: NORMAL
EKG P AXIS: 41 DEGREES
EKG P-R INTERVAL: 158 MS
EKG Q-T INTERVAL: 404 MS
EKG QRS DURATION: 82 MS
EKG QTC CALCULATION (BAZETT): 399 MS
EKG R AXIS: 0 DEGREES
EKG T AXIS: 24 DEGREES
EKG VENTRICULAR RATE: 59 BPM